# Patient Record
Sex: FEMALE | Race: WHITE | NOT HISPANIC OR LATINO | Employment: FULL TIME | URBAN - METROPOLITAN AREA
[De-identification: names, ages, dates, MRNs, and addresses within clinical notes are randomized per-mention and may not be internally consistent; named-entity substitution may affect disease eponyms.]

---
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
## 2022-01-17 LAB
INFLUENZA A ANTIGEN, POC: NEGATIVE
INFLUENZA B ANTIGEN, POC: NEGATIVE
SARS-COV-2 RNA RESP QL NAA+PROBE: NEGATIVE

## 2022-01-18 ENCOUNTER — HISTORICAL (OUTPATIENT)
Dept: LAB | Facility: HOSPITAL | Age: 22
End: 2022-01-18

## 2022-04-07 ENCOUNTER — HISTORICAL (OUTPATIENT)
Dept: ADMINISTRATIVE | Facility: HOSPITAL | Age: 22
End: 2022-04-07
Payer: COMMERCIAL

## 2022-04-24 VITALS
OXYGEN SATURATION: 99 % | HEIGHT: 62 IN | BODY MASS INDEX: 23.82 KG/M2 | SYSTOLIC BLOOD PRESSURE: 116 MMHG | DIASTOLIC BLOOD PRESSURE: 81 MMHG | WEIGHT: 129.44 LBS

## 2022-09-21 ENCOUNTER — HISTORICAL (OUTPATIENT)
Dept: ADMINISTRATIVE | Facility: HOSPITAL | Age: 22
End: 2022-09-21
Payer: COMMERCIAL

## 2023-05-11 ENCOUNTER — LAB VISIT (OUTPATIENT)
Dept: LAB | Facility: HOSPITAL | Age: 23
End: 2023-05-11
Attending: OBSTETRICS & GYNECOLOGY
Payer: COMMERCIAL

## 2023-05-11 DIAGNOSIS — Z34.80 PRENATAL CARE, SUBSEQUENT PREGNANCY: Primary | ICD-10-CM

## 2023-05-11 LAB
ERYTHROCYTE [DISTWIDTH] IN BLOOD BY AUTOMATED COUNT: 11.9 % (ref 11.5–17)
GROUP & RH: NORMAL
HBV SURFACE AG SERPL QL IA: NONREACTIVE
HCT VFR BLD AUTO: 41.3 % (ref 37–47)
HGB BLD-MCNC: 13.8 G/DL (ref 12–16)
HIV 1+2 AB+HIV1 P24 AG SERPL QL IA: NONREACTIVE
INDIRECT COOMBS GEL: NORMAL
MCH RBC QN AUTO: 27.8 PG (ref 27–31)
MCHC RBC AUTO-ENTMCNC: 33.4 G/DL (ref 33–36)
MCV RBC AUTO: 83.1 FL (ref 80–94)
NRBC BLD AUTO-RTO: 0 %
PLATELET # BLD AUTO: 275 X10(3)/MCL (ref 130–400)
PMV BLD AUTO: 10.4 FL (ref 7.4–10.4)
PROGEST SERPL-MCNC: 17.8 NG/ML
RBC # BLD AUTO: 4.97 X10(6)/MCL (ref 4.2–5.4)
SPECIMEN OUTDATE: NORMAL
T PALLIDUM AB SER QL: NONREACTIVE
WBC # SPEC AUTO: 6.73 X10(3)/MCL (ref 4.5–11.5)

## 2023-05-11 PROCEDURE — 87088 URINE BACTERIA CULTURE: CPT

## 2023-05-11 PROCEDURE — 36415 COLL VENOUS BLD VENIPUNCTURE: CPT

## 2023-05-11 PROCEDURE — 86780 TREPONEMA PALLIDUM: CPT

## 2023-05-11 PROCEDURE — 84144 ASSAY OF PROGESTERONE: CPT

## 2023-05-11 PROCEDURE — 85660 RBC SICKLE CELL TEST: CPT

## 2023-05-11 PROCEDURE — 87340 HEPATITIS B SURFACE AG IA: CPT

## 2023-05-11 PROCEDURE — 85027 COMPLETE CBC AUTOMATED: CPT

## 2023-05-11 PROCEDURE — 86762 RUBELLA ANTIBODY: CPT | Mod: 90

## 2023-05-11 PROCEDURE — 86900 BLOOD TYPING SEROLOGIC ABO: CPT | Performed by: OBSTETRICS & GYNECOLOGY

## 2023-05-11 PROCEDURE — 87389 HIV-1 AG W/HIV-1&-2 AB AG IA: CPT

## 2023-05-12 LAB
HGB S BLD QL SOLY: NEGATIVE
RUBV IGG SERPL IA-ACNC: 0.4
RUBV IGG SERPL QL IA: NEGATIVE

## 2023-05-13 LAB — BACTERIA UR CULT: NO GROWTH

## 2023-12-12 ENCOUNTER — HOSPITAL ENCOUNTER (EMERGENCY)
Facility: HOSPITAL | Age: 23
Discharge: HOME | End: 2023-12-12
Attending: SPECIALIST
Payer: COMMERCIAL

## 2023-12-12 VITALS
DIASTOLIC BLOOD PRESSURE: 76 MMHG | TEMPERATURE: 98 F | OXYGEN SATURATION: 97 % | RESPIRATION RATE: 16 BRPM | SYSTOLIC BLOOD PRESSURE: 125 MMHG | HEART RATE: 97 BPM

## 2023-12-12 PROCEDURE — 99284 EMERGENCY DEPT VISIT MOD MDM: CPT

## 2023-12-12 NOTE — DISCHARGE INSTRUCTIONS
Return to hospital for leaking fluid, regular contractions, vaginal bleeding, and decreased fetal movement. Otherwise, keep scheduled appointments.

## 2023-12-12 NOTE — ED PROVIDER NOTES
ALESSANDRO NOTE     Admit Date: 2023  ALESSANDRO Physician: Elijah Khan  Primary OBGYN: Dr. Tin Kirkpatrick    Admit Diagnosis/Chief Complaint:       Chief Complaint   Patient presents with    Fall     IUP 38.4w c/o falling up the stairs, caught self with knees and elbows       Hospital Course:  Alfredo Hollingsworth is a 23 y.o.  at 38w4d presents complaining of having a minor fall on some stairs this morning.  Patient reports a straight knee but no other injury.    This IUP is complicated by no reported problems.    Patient denies vaginal bleeding, leakage of fluid, contractions, headache, vision changes, RUQ pain, dysuria, fever, and nausea/vomiting.  Fetal Movement: normal.      /76   Pulse 97   Temp 97.9 °F (36.6 °C)   Resp 16   SpO2 97%   Temp:  [97.9 °F (36.6 °C)] 97.9 °F (36.6 °C)  Pulse:  [] 95  Resp:  [16] 16  SpO2:  [97 %] 97 %  BP: (142)/(87) 142/87    General: in no apparent distress  Cardiovascular: regular rate and rhythm no murmurs  Respiratory: clear to auscultation, no wheezes, rales or rhonchi, symmetric air entry  Abdominal: fundus soft, nontender 38 weeks size  Back: lumbar tenderness present CVA tenderness none  Extremeties no redness or tenderness in the calves or thighs no edema minor brush burn scrape on the underside of the left knee.  Knee joint and patella appeared to be uninjured.    SSE:   SVE:  1 cm, 50%, -3, membranes intact      FHT: Category 1  TOCO:  No contractions are noted    Medical Decision Making:  Patient was reassured about fetal well-being.  External monitoring is within normal limits.  Minor injury care was discussed.  Patient will follow up with her primary OB.    LABS:   No results found for this or any previous visit (from the past 24 hour(s)).  [unfilled]     Imaging Results    None          ASSESMENT: Alfredo Hollingsworth is a 23 y.o.   at 38w4d with minor fall and excoriation on her left knee area    Discharge  Diagnosis/Clinical Impression:  Pregnancy 38 weeks.  Minor fall in pregnancy.    Status:Improved/stable    Disposition:  discharged to home    Medications:       Patient Instructions:   There are no discharge medications for this patient.      - Pt was given routine pregnancy instructions including to return to triage if she had any vaginal bleeding (other than spotting for the next 48hrs), any loss of fluid like her water broke, decreased fetal movement, or contractions Q 5min lasting for 2 or more hours. Pt was also instructed to drink copious water. Patient voiced understanding of all these instructions and was subsequently discharged home.    She will follow up with her primary OB.    No discharge procedures on file.    Elijah Khan MD  OB-GYN Hospitalist       Elijah Khan MD  12/12/23 0563

## 2023-12-21 ENCOUNTER — ANESTHESIA (OUTPATIENT)
Dept: OBSTETRICS AND GYNECOLOGY | Facility: HOSPITAL | Age: 23
End: 2023-12-21
Payer: COMMERCIAL

## 2023-12-21 ENCOUNTER — ANESTHESIA EVENT (OUTPATIENT)
Dept: OBSTETRICS AND GYNECOLOGY | Facility: HOSPITAL | Age: 23
End: 2023-12-21
Payer: COMMERCIAL

## 2023-12-21 ENCOUNTER — HOSPITAL ENCOUNTER (INPATIENT)
Facility: HOSPITAL | Age: 23
LOS: 3 days | Discharge: HOME | End: 2023-12-24
Attending: OBSTETRICS & GYNECOLOGY | Admitting: OBSTETRICS & GYNECOLOGY
Payer: COMMERCIAL

## 2023-12-21 DIAGNOSIS — Z34.90 ENCOUNTER FOR INDUCTION OF LABOR: Primary | ICD-10-CM

## 2023-12-21 LAB
BASOPHILS # BLD AUTO: 0.02 X10(3)/MCL
BASOPHILS NFR BLD AUTO: 0.2 %
EOSINOPHIL # BLD AUTO: 0.08 X10(3)/MCL (ref 0–0.9)
EOSINOPHIL NFR BLD AUTO: 1 %
ERYTHROCYTE [DISTWIDTH] IN BLOOD BY AUTOMATED COUNT: 12.8 % (ref 11.5–17)
GROUP & RH: NORMAL
HCT VFR BLD AUTO: 38.3 % (ref 37–47)
HGB BLD-MCNC: 13.3 G/DL (ref 12–16)
IMM GRANULOCYTES # BLD AUTO: 0.02 X10(3)/MCL (ref 0–0.04)
IMM GRANULOCYTES NFR BLD AUTO: 0.2 %
INDIRECT COOMBS GEL: NORMAL
LYMPHOCYTES # BLD AUTO: 2.12 X10(3)/MCL (ref 0.6–4.6)
LYMPHOCYTES NFR BLD AUTO: 25.5 %
MCH RBC QN AUTO: 28.7 PG (ref 27–31)
MCHC RBC AUTO-ENTMCNC: 34.7 G/DL (ref 33–36)
MCV RBC AUTO: 82.5 FL (ref 80–94)
MONOCYTES # BLD AUTO: 0.49 X10(3)/MCL (ref 0.1–1.3)
MONOCYTES NFR BLD AUTO: 5.9 %
NEUTROPHILS # BLD AUTO: 5.6 X10(3)/MCL (ref 2.1–9.2)
NEUTROPHILS NFR BLD AUTO: 67.2 %
NRBC BLD AUTO-RTO: 0 %
PLATELET # BLD AUTO: 228 X10(3)/MCL (ref 130–400)
PMV BLD AUTO: 10.5 FL (ref 7.4–10.4)
RBC # BLD AUTO: 4.64 X10(6)/MCL (ref 4.2–5.4)
SPECIMEN OUTDATE: NORMAL
T PALLIDUM AB SER QL: NONREACTIVE
WBC # SPEC AUTO: 8.33 X10(3)/MCL (ref 4.5–11.5)

## 2023-12-21 PROCEDURE — 25000003 PHARM REV CODE 250: Performed by: ANESTHESIOLOGY

## 2023-12-21 PROCEDURE — 59514 PRA REAN DELIVERY ONLY: ICD-10-PCS | Mod: QY,ANES,, | Performed by: ANESTHESIOLOGY

## 2023-12-21 PROCEDURE — 51702 INSERT TEMP BLADDER CATH: CPT

## 2023-12-21 PROCEDURE — 37000008 HC ANESTHESIA 1ST 15 MINUTES: Performed by: OBSTETRICS & GYNECOLOGY

## 2023-12-21 PROCEDURE — 01968 ANES/ANALG CS DLVR NEURAXIAL: CPT | Mod: QY,ANES,, | Performed by: ANESTHESIOLOGY

## 2023-12-21 PROCEDURE — 59514 PRA REAN DELIVERY ONLY: ICD-10-PCS | Mod: QX,CRNA,,

## 2023-12-21 PROCEDURE — 01968 ANES/ANALG CS DLVR NEURAXIAL: CPT | Mod: QX,CRNA,,

## 2023-12-21 PROCEDURE — 63600175 PHARM REV CODE 636 W HCPCS: Performed by: ANESTHESIOLOGY

## 2023-12-21 PROCEDURE — 37000009 HC ANESTHESIA EA ADD 15 MINS: Performed by: OBSTETRICS & GYNECOLOGY

## 2023-12-21 PROCEDURE — 01968 PR INSERT CATH,ART,PERCUT,SHORTTERM: ICD-10-PCS | Mod: QY,ANES,, | Performed by: ANESTHESIOLOGY

## 2023-12-21 PROCEDURE — 62326 NJX INTERLAMINAR LMBR/SAC: CPT | Performed by: ANESTHESIOLOGY

## 2023-12-21 PROCEDURE — 71000033 HC RECOVERY, INTIAL HOUR: Performed by: OBSTETRICS & GYNECOLOGY

## 2023-12-21 PROCEDURE — 63600175 PHARM REV CODE 636 W HCPCS

## 2023-12-21 PROCEDURE — 11000001 HC ACUTE MED/SURG PRIVATE ROOM

## 2023-12-21 PROCEDURE — 36004724 HC OB OR TIME LEV III - 1ST 15 MIN: Performed by: OBSTETRICS & GYNECOLOGY

## 2023-12-21 PROCEDURE — 01968 PR INSERT CATH,ART,PERCUT,SHORTTERM: ICD-10-PCS | Mod: QX,CRNA,,

## 2023-12-21 PROCEDURE — 59514 CESAREAN DELIVERY ONLY: CPT | Mod: QY,ANES,, | Performed by: ANESTHESIOLOGY

## 2023-12-21 PROCEDURE — 86850 RBC ANTIBODY SCREEN: CPT | Performed by: OBSTETRICS & GYNECOLOGY

## 2023-12-21 PROCEDURE — 36004725 HC OB OR TIME LEV III - EA ADD 15 MIN: Performed by: OBSTETRICS & GYNECOLOGY

## 2023-12-21 PROCEDURE — 85025 COMPLETE CBC W/AUTO DIFF WBC: CPT | Performed by: OBSTETRICS & GYNECOLOGY

## 2023-12-21 PROCEDURE — 25000003 PHARM REV CODE 250: Performed by: OBSTETRICS & GYNECOLOGY

## 2023-12-21 PROCEDURE — 27201423 OPTIME MED/SURG SUP & DEVICES STERILE SUPPLY: Performed by: OBSTETRICS & GYNECOLOGY

## 2023-12-21 PROCEDURE — 59514 CESAREAN DELIVERY ONLY: CPT | Mod: QX,CRNA,,

## 2023-12-21 PROCEDURE — 63600175 PHARM REV CODE 636 W HCPCS: Performed by: OBSTETRICS & GYNECOLOGY

## 2023-12-21 PROCEDURE — 86780 TREPONEMA PALLIDUM: CPT | Performed by: OBSTETRICS & GYNECOLOGY

## 2023-12-21 RX ORDER — MUPIROCIN 20 MG/G
OINTMENT TOPICAL
OUTPATIENT
Start: 2023-12-21

## 2023-12-21 RX ORDER — MISOPROSTOL 100 UG/1
800 TABLET ORAL ONCE AS NEEDED
Status: DISCONTINUED | OUTPATIENT
Start: 2023-12-21 | End: 2023-12-22

## 2023-12-21 RX ORDER — METHYLERGONOVINE MALEATE 0.2 MG/ML
200 INJECTION INTRAVENOUS ONCE AS NEEDED
Status: COMPLETED | OUTPATIENT
Start: 2023-12-21 | End: 2023-12-22

## 2023-12-21 RX ORDER — SODIUM CITRATE AND CITRIC ACID MONOHYDRATE 334; 500 MG/5ML; MG/5ML
30 SOLUTION ORAL ONCE
Status: CANCELLED | OUTPATIENT
Start: 2023-12-21 | End: 2023-12-21

## 2023-12-21 RX ORDER — OXYTOCIN/RINGER'S LACTATE 30/500 ML
95 PLASTIC BAG, INJECTION (ML) INTRAVENOUS ONCE AS NEEDED
Status: DISCONTINUED | OUTPATIENT
Start: 2023-12-21 | End: 2023-12-22

## 2023-12-21 RX ORDER — BUPIVACAINE HYDROCHLORIDE 2.5 MG/ML
INJECTION, SOLUTION EPIDURAL; INFILTRATION; INTRACAUDAL
Status: COMPLETED | OUTPATIENT
Start: 2023-12-21 | End: 2023-12-21

## 2023-12-21 RX ORDER — MISOPROSTOL 100 MCG
25 TABLET ORAL ONCE
Status: COMPLETED | OUTPATIENT
Start: 2023-12-21 | End: 2023-12-21

## 2023-12-21 RX ORDER — ONDANSETRON 4 MG/1
8 TABLET, ORALLY DISINTEGRATING ORAL EVERY 8 HOURS PRN
Status: DISCONTINUED | OUTPATIENT
Start: 2023-12-21 | End: 2023-12-22

## 2023-12-21 RX ORDER — SIMETHICONE 80 MG
1 TABLET,CHEWABLE ORAL 4 TIMES DAILY PRN
Status: DISCONTINUED | OUTPATIENT
Start: 2023-12-21 | End: 2023-12-22

## 2023-12-21 RX ORDER — OXYTOCIN/RINGER'S LACTATE 30/500 ML
334 PLASTIC BAG, INJECTION (ML) INTRAVENOUS ONCE
Status: DISCONTINUED | OUTPATIENT
Start: 2023-12-21 | End: 2023-12-21

## 2023-12-21 RX ORDER — OXYTOCIN/RINGER'S LACTATE 30/500 ML
95 PLASTIC BAG, INJECTION (ML) INTRAVENOUS ONCE
Status: DISCONTINUED | OUTPATIENT
Start: 2023-12-21 | End: 2023-12-21

## 2023-12-21 RX ORDER — FENTANYL/BUPIVACAINE/NS/PF 2-1250MCG
PLASTIC BAG, INJECTION (ML) INJECTION CONTINUOUS
Status: DISCONTINUED | OUTPATIENT
Start: 2023-12-21 | End: 2023-12-24 | Stop reason: HOSPADM

## 2023-12-21 RX ORDER — SODIUM CHLORIDE, SODIUM LACTATE, POTASSIUM CHLORIDE, CALCIUM CHLORIDE 600; 310; 30; 20 MG/100ML; MG/100ML; MG/100ML; MG/100ML
INJECTION, SOLUTION INTRAVENOUS CONTINUOUS
Status: DISCONTINUED | OUTPATIENT
Start: 2023-12-21 | End: 2023-12-24 | Stop reason: HOSPADM

## 2023-12-21 RX ORDER — DIPHENOXYLATE HYDROCHLORIDE AND ATROPINE SULFATE 2.5; .025 MG/1; MG/1
2 TABLET ORAL EVERY 6 HOURS PRN
Status: DISCONTINUED | OUTPATIENT
Start: 2023-12-21 | End: 2023-12-22

## 2023-12-21 RX ORDER — CALCIUM CARBONATE 200(500)MG
500 TABLET,CHEWABLE ORAL 3 TIMES DAILY PRN
Status: DISCONTINUED | OUTPATIENT
Start: 2023-12-21 | End: 2023-12-24 | Stop reason: HOSPADM

## 2023-12-21 RX ORDER — MISOPROSTOL 100 MCG
50 TABLET ORAL ONCE
Status: DISCONTINUED | OUTPATIENT
Start: 2023-12-21 | End: 2023-12-21

## 2023-12-21 RX ORDER — OXYTOCIN/RINGER'S LACTATE 30/500 ML
334 PLASTIC BAG, INJECTION (ML) INTRAVENOUS ONCE AS NEEDED
Status: DISCONTINUED | OUTPATIENT
Start: 2023-12-21 | End: 2023-12-22

## 2023-12-21 RX ORDER — NALBUPHINE HYDROCHLORIDE 10 MG/ML
2.5 INJECTION, SOLUTION INTRAMUSCULAR; INTRAVENOUS; SUBCUTANEOUS ONCE
Status: CANCELLED | OUTPATIENT
Start: 2023-12-21 | End: 2023-12-21

## 2023-12-21 RX ORDER — PROCHLORPERAZINE EDISYLATE 5 MG/ML
5 INJECTION INTRAMUSCULAR; INTRAVENOUS EVERY 6 HOURS PRN
Status: DISCONTINUED | OUTPATIENT
Start: 2023-12-21 | End: 2023-12-22

## 2023-12-21 RX ORDER — OXYTOCIN/RINGER'S LACTATE 30/500 ML
0-30 PLASTIC BAG, INJECTION (ML) INTRAVENOUS CONTINUOUS
Status: DISCONTINUED | OUTPATIENT
Start: 2023-12-21 | End: 2023-12-24 | Stop reason: HOSPADM

## 2023-12-21 RX ORDER — LIDOCAINE HYDROCHLORIDE 10 MG/ML
10 INJECTION INFILTRATION; PERINEURAL ONCE AS NEEDED
Status: DISCONTINUED | OUTPATIENT
Start: 2023-12-21 | End: 2023-12-24 | Stop reason: HOSPADM

## 2023-12-21 RX ORDER — EPHEDRINE SULFATE 50 MG/ML
10 INJECTION, SOLUTION INTRAVENOUS ONCE AS NEEDED
Status: CANCELLED | OUTPATIENT
Start: 2023-12-21 | End: 2035-05-19

## 2023-12-21 RX ORDER — OXYTOCIN 10 [USP'U]/ML
10 INJECTION, SOLUTION INTRAMUSCULAR; INTRAVENOUS ONCE AS NEEDED
Status: DISCONTINUED | OUTPATIENT
Start: 2023-12-21 | End: 2023-12-22

## 2023-12-21 RX ORDER — MISOPROSTOL 100 MCG
50 TABLET ORAL ONCE
Status: COMPLETED | OUTPATIENT
Start: 2023-12-21 | End: 2023-12-21

## 2023-12-21 RX ORDER — FAMOTIDINE 10 MG/ML
20 INJECTION INTRAVENOUS ONCE
Status: CANCELLED | OUTPATIENT
Start: 2023-12-21 | End: 2023-12-21

## 2023-12-21 RX ORDER — OXYTOCIN/RINGER'S LACTATE 30/500 ML
4 PLASTIC BAG, INJECTION (ML) INTRAVENOUS CONTINUOUS
Status: DISCONTINUED | OUTPATIENT
Start: 2023-12-21 | End: 2023-12-21

## 2023-12-21 RX ORDER — CARBOPROST TROMETHAMINE 250 UG/ML
250 INJECTION, SOLUTION INTRAMUSCULAR
Status: DISCONTINUED | OUTPATIENT
Start: 2023-12-21 | End: 2023-12-22

## 2023-12-21 RX ADMIN — Medication 12 ML/HR: at 08:12

## 2023-12-21 RX ADMIN — MISOPROSTOL 25 MCG: 100 TABLET ORAL at 02:12

## 2023-12-21 RX ADMIN — SODIUM CHLORIDE, POTASSIUM CHLORIDE, SODIUM LACTATE AND CALCIUM CHLORIDE: 600; 310; 30; 20 INJECTION, SOLUTION INTRAVENOUS at 11:12

## 2023-12-21 RX ADMIN — FENTANYL CITRATE 100 MCG: 50 INJECTION, SOLUTION INTRAMUSCULAR; INTRAVENOUS at 11:12

## 2023-12-21 RX ADMIN — Medication 4 MILLI-UNITS/MIN: at 10:12

## 2023-12-21 RX ADMIN — LIDOCAINE HYDROCHLORIDE 15 ML: 20 INJECTION, SOLUTION EPIDURAL; INFILTRATION; INTRACAUDAL; PERINEURAL at 11:12

## 2023-12-21 RX ADMIN — MISOPROSTOL 25 MCG: 100 TABLET ORAL at 09:12

## 2023-12-21 RX ADMIN — ONDANSETRON 8 MG: 2 INJECTION INTRAMUSCULAR; INTRAVENOUS at 11:12

## 2023-12-21 RX ADMIN — SODIUM CHLORIDE, POTASSIUM CHLORIDE, SODIUM LACTATE AND CALCIUM CHLORIDE: 600; 310; 30; 20 INJECTION, SOLUTION INTRAVENOUS at 08:12

## 2023-12-21 RX ADMIN — BUPIVACAINE HYDROCHLORIDE 10 ML: 2.5 INJECTION, SOLUTION EPIDURAL; INFILTRATION; INTRACAUDAL; PERINEURAL at 08:12

## 2023-12-21 NOTE — PROGRESS NOTES
"Patient states md was at bedside and checked her cervix.  States she was "almost 3."  Patient's mother states Dr. Kirkpatrick said he will see if she can have a sandwich.  Will call MD for further orders.  "

## 2023-12-22 PROCEDURE — 63600175 PHARM REV CODE 636 W HCPCS

## 2023-12-22 PROCEDURE — 72100002 HC LABOR CARE, 1ST 8 HOURS

## 2023-12-22 PROCEDURE — 25000003 PHARM REV CODE 250

## 2023-12-22 PROCEDURE — 11000001 HC ACUTE MED/SURG PRIVATE ROOM

## 2023-12-22 PROCEDURE — 63600175 PHARM REV CODE 636 W HCPCS: Performed by: OBSTETRICS & GYNECOLOGY

## 2023-12-22 PROCEDURE — 72100003 HC LABOR CARE, EA. ADDL. 8 HRS

## 2023-12-22 PROCEDURE — 25000003 PHARM REV CODE 250: Performed by: OBSTETRICS & GYNECOLOGY

## 2023-12-22 PROCEDURE — 27000492 HC SLEEVE, SCD T/L

## 2023-12-22 RX ORDER — OXYTOCIN 10 [USP'U]/ML
10 INJECTION, SOLUTION INTRAMUSCULAR; INTRAVENOUS ONCE AS NEEDED
Status: DISCONTINUED | OUTPATIENT
Start: 2023-12-22 | End: 2023-12-24 | Stop reason: HOSPADM

## 2023-12-22 RX ORDER — PROCHLORPERAZINE EDISYLATE 5 MG/ML
5 INJECTION INTRAMUSCULAR; INTRAVENOUS EVERY 6 HOURS PRN
Status: DISCONTINUED | OUTPATIENT
Start: 2023-12-22 | End: 2023-12-24 | Stop reason: HOSPADM

## 2023-12-22 RX ORDER — DIPHENHYDRAMINE HCL 25 MG
25 CAPSULE ORAL EVERY 4 HOURS PRN
Status: DISCONTINUED | OUTPATIENT
Start: 2023-12-22 | End: 2023-12-24 | Stop reason: HOSPADM

## 2023-12-22 RX ORDER — PRENATAL WITH FERROUS FUM AND FOLIC ACID 3080; 920; 120; 400; 22; 1.84; 3; 20; 10; 1; 12; 200; 27; 25; 2 [IU]/1; [IU]/1; MG/1; [IU]/1; MG/1; MG/1; MG/1; MG/1; MG/1; MG/1; UG/1; MG/1; MG/1; MG/1; MG/1
1 TABLET ORAL DAILY
Status: DISCONTINUED | OUTPATIENT
Start: 2023-12-22 | End: 2023-12-24 | Stop reason: HOSPADM

## 2023-12-22 RX ORDER — CEFAZOLIN SODIUM 1 G/3ML
INJECTION, POWDER, FOR SOLUTION INTRAMUSCULAR; INTRAVENOUS
Status: DISCONTINUED | OUTPATIENT
Start: 2023-12-22 | End: 2023-12-22

## 2023-12-22 RX ORDER — MORPHINE SULFATE 1 MG/ML
INJECTION, SOLUTION EPIDURAL; INTRATHECAL; INTRAVENOUS
Status: DISCONTINUED | OUTPATIENT
Start: 2023-12-22 | End: 2023-12-22

## 2023-12-22 RX ORDER — DIPHENOXYLATE HYDROCHLORIDE AND ATROPINE SULFATE 2.5; .025 MG/1; MG/1
2 TABLET ORAL EVERY 6 HOURS PRN
Status: DISCONTINUED | OUTPATIENT
Start: 2023-12-22 | End: 2023-12-24 | Stop reason: HOSPADM

## 2023-12-22 RX ORDER — ONDANSETRON 4 MG/1
8 TABLET, ORALLY DISINTEGRATING ORAL EVERY 8 HOURS PRN
Status: DISCONTINUED | OUTPATIENT
Start: 2023-12-22 | End: 2023-12-24 | Stop reason: HOSPADM

## 2023-12-22 RX ORDER — ACETAMINOPHEN 10 MG/ML
INJECTION, SOLUTION INTRAVENOUS
Status: DISCONTINUED | OUTPATIENT
Start: 2023-12-22 | End: 2023-12-22

## 2023-12-22 RX ORDER — FENTANYL CITRATE 50 UG/ML
INJECTION, SOLUTION INTRAMUSCULAR; INTRAVENOUS
Status: DISCONTINUED | OUTPATIENT
Start: 2023-12-21 | End: 2023-12-22

## 2023-12-22 RX ORDER — HYDROMORPHONE HYDROCHLORIDE 2 MG/ML
1 INJECTION, SOLUTION INTRAMUSCULAR; INTRAVENOUS; SUBCUTANEOUS EVERY 4 HOURS PRN
Status: DISCONTINUED | OUTPATIENT
Start: 2023-12-22 | End: 2023-12-24 | Stop reason: HOSPADM

## 2023-12-22 RX ORDER — OXYCODONE AND ACETAMINOPHEN 10; 325 MG/1; MG/1
1 TABLET ORAL EVERY 4 HOURS PRN
Status: DISCONTINUED | OUTPATIENT
Start: 2023-12-22 | End: 2023-12-24 | Stop reason: HOSPADM

## 2023-12-22 RX ORDER — DOCUSATE SODIUM 100 MG/1
200 CAPSULE, LIQUID FILLED ORAL 2 TIMES DAILY
Status: DISCONTINUED | OUTPATIENT
Start: 2023-12-22 | End: 2023-12-24 | Stop reason: HOSPADM

## 2023-12-22 RX ORDER — ONDANSETRON 2 MG/ML
INJECTION INTRAMUSCULAR; INTRAVENOUS
Status: DISCONTINUED | OUTPATIENT
Start: 2023-12-21 | End: 2023-12-22

## 2023-12-22 RX ORDER — MISOPROSTOL 100 UG/1
800 TABLET ORAL ONCE AS NEEDED
Status: DISCONTINUED | OUTPATIENT
Start: 2023-12-22 | End: 2023-12-24 | Stop reason: HOSPADM

## 2023-12-22 RX ORDER — OXYTOCIN/RINGER'S LACTATE 30/500 ML
334 PLASTIC BAG, INJECTION (ML) INTRAVENOUS ONCE AS NEEDED
Status: DISCONTINUED | OUTPATIENT
Start: 2023-12-22 | End: 2023-12-24 | Stop reason: HOSPADM

## 2023-12-22 RX ORDER — LIDOCAINE HYDROCHLORIDE 20 MG/ML
INJECTION, SOLUTION EPIDURAL; INFILTRATION; INTRACAUDAL; PERINEURAL
Status: DISCONTINUED | OUTPATIENT
Start: 2023-12-21 | End: 2023-12-22

## 2023-12-22 RX ORDER — SODIUM CHLORIDE 0.9 % (FLUSH) 0.9 %
10 SYRINGE (ML) INJECTION
Status: DISCONTINUED | OUTPATIENT
Start: 2023-12-22 | End: 2023-12-24 | Stop reason: HOSPADM

## 2023-12-22 RX ORDER — METHYLERGONOVINE MALEATE 0.2 MG/ML
200 INJECTION INTRAVENOUS ONCE AS NEEDED
Status: DISCONTINUED | OUTPATIENT
Start: 2023-12-22 | End: 2023-12-24 | Stop reason: HOSPADM

## 2023-12-22 RX ORDER — BISACODYL 10 MG
10 SUPPOSITORY, RECTAL RECTAL ONCE AS NEEDED
Status: DISCONTINUED | OUTPATIENT
Start: 2023-12-22 | End: 2023-12-24 | Stop reason: HOSPADM

## 2023-12-22 RX ORDER — OXYTOCIN/RINGER'S LACTATE 30/500 ML
95 PLASTIC BAG, INJECTION (ML) INTRAVENOUS ONCE AS NEEDED
Status: DISCONTINUED | OUTPATIENT
Start: 2023-12-22 | End: 2023-12-24 | Stop reason: HOSPADM

## 2023-12-22 RX ORDER — OXYTOCIN/RINGER'S LACTATE 30/500 ML
95 PLASTIC BAG, INJECTION (ML) INTRAVENOUS ONCE
Status: DISCONTINUED | OUTPATIENT
Start: 2023-12-22 | End: 2023-12-24 | Stop reason: HOSPADM

## 2023-12-22 RX ORDER — TRANEXAMIC ACID 100 MG/ML
INJECTION, SOLUTION INTRAVENOUS
Status: DISCONTINUED | OUTPATIENT
Start: 2023-12-22 | End: 2023-12-22

## 2023-12-22 RX ORDER — OXYTOCIN/RINGER'S LACTATE 30/500 ML
PLASTIC BAG, INJECTION (ML) INTRAVENOUS
Status: DISCONTINUED | OUTPATIENT
Start: 2023-12-22 | End: 2023-12-22

## 2023-12-22 RX ORDER — AMOXICILLIN 250 MG
1 CAPSULE ORAL NIGHTLY PRN
Status: DISCONTINUED | OUTPATIENT
Start: 2023-12-22 | End: 2023-12-24 | Stop reason: HOSPADM

## 2023-12-22 RX ORDER — ADHESIVE BANDAGE
30 BANDAGE TOPICAL 2 TIMES DAILY PRN
Status: DISCONTINUED | OUTPATIENT
Start: 2023-12-23 | End: 2023-12-24 | Stop reason: HOSPADM

## 2023-12-22 RX ORDER — SIMETHICONE 80 MG
1 TABLET,CHEWABLE ORAL EVERY 6 HOURS PRN
Status: DISCONTINUED | OUTPATIENT
Start: 2023-12-22 | End: 2023-12-24 | Stop reason: HOSPADM

## 2023-12-22 RX ORDER — SODIUM CHLORIDE, SODIUM LACTATE, POTASSIUM CHLORIDE, CALCIUM CHLORIDE 600; 310; 30; 20 MG/100ML; MG/100ML; MG/100ML; MG/100ML
INJECTION, SOLUTION INTRAVENOUS CONTINUOUS PRN
Status: DISCONTINUED | OUTPATIENT
Start: 2023-12-21 | End: 2023-12-22

## 2023-12-22 RX ORDER — CARBOPROST TROMETHAMINE 250 UG/ML
250 INJECTION, SOLUTION INTRAMUSCULAR
Status: DISCONTINUED | OUTPATIENT
Start: 2023-12-22 | End: 2023-12-24 | Stop reason: HOSPADM

## 2023-12-22 RX ORDER — IBUPROFEN 600 MG/1
600 TABLET ORAL 4 TIMES DAILY
Status: DISCONTINUED | OUTPATIENT
Start: 2023-12-22 | End: 2023-12-24 | Stop reason: HOSPADM

## 2023-12-22 RX ADMIN — MORPHINE SULFATE 2 MG: 1 INJECTION, SOLUTION EPIDURAL; INTRATHECAL; INTRAVENOUS at 12:12

## 2023-12-22 RX ADMIN — LIDOCAINE HYDROCHLORIDE 5 ML: 20 INJECTION, SOLUTION EPIDURAL; INFILTRATION; INTRACAUDAL; PERINEURAL at 12:12

## 2023-12-22 RX ADMIN — IBUPROFEN 600 MG: 600 TABLET, FILM COATED ORAL at 02:12

## 2023-12-22 RX ADMIN — ACETAMINOPHEN 1000 MG: 10 INJECTION, SOLUTION INTRAVENOUS at 12:12

## 2023-12-22 RX ADMIN — MORPHINE SULFATE 3 MG: 1 INJECTION, SOLUTION EPIDURAL; INTRATHECAL; INTRAVENOUS at 12:12

## 2023-12-22 RX ADMIN — PRENATAL VITAMINS-IRON FUMARATE 27 MG IRON-FOLIC ACID 0.8 MG TABLET 1 TABLET: at 08:12

## 2023-12-22 RX ADMIN — OXYCODONE AND ACETAMINOPHEN 1 TABLET: 10; 325 TABLET ORAL at 09:12

## 2023-12-22 RX ADMIN — Medication 500 ML: at 12:12

## 2023-12-22 RX ADMIN — CEFAZOLIN 2 G: 330 INJECTION, POWDER, FOR SOLUTION INTRAMUSCULAR; INTRAVENOUS at 12:12

## 2023-12-22 RX ADMIN — METHYLERGONOVINE MALEATE 200 MCG: 0.2 INJECTION, SOLUTION INTRAMUSCULAR; INTRAVENOUS at 12:12

## 2023-12-22 RX ADMIN — IBUPROFEN 600 MG: 600 TABLET, FILM COATED ORAL at 08:12

## 2023-12-22 RX ADMIN — DOCUSATE SODIUM 200 MG: 100 CAPSULE, LIQUID FILLED ORAL at 08:12

## 2023-12-22 RX ADMIN — TRANEXAMIC ACID 1000 MG: 100 INJECTION, SOLUTION INTRAVENOUS at 12:12

## 2023-12-22 NOTE — ANESTHESIA PREPROCEDURE EVALUATION
12/21/2023  Alfredo Hollingsworth is a 23 y.o., female.      Pre-op Assessment    I have reviewed the Patient Summary Reports.     I have reviewed the Nursing Notes. I have reviewed the NPO Status.   I have reviewed the Medications.     Review of Systems  Anesthesia Hx:  No problems with previous Anesthesia                Hematology/Oncology:  Hematology Normal   Oncology Normal                                   EENT/Dental:  EENT/Dental Normal           Cardiovascular:  Cardiovascular Normal                                            Pulmonary:  Pulmonary Normal                       Renal/:  Renal/ Normal                 Hepatic/GI:  Hepatic/GI Normal                 Musculoskeletal:  Musculoskeletal Normal                Neurological:  Neurology Normal                                      Endocrine:  Endocrine Normal            Dermatological:  Skin Normal    Psych:  Psychiatric Normal                  Physical Exam  General: Well nourished, Cooperative, Alert and Oriented    Airway:  Mallampati: II   Mouth Opening: Normal  TM Distance: Normal  Tongue: Normal  Neck ROM: Normal ROM    Dental:  Intact    Chest/Lungs:  Clear to auscultation    Heart:  Rate: Normal    Anesthesia Plan  Type of Anesthesia, risks & benefits discussed:    Anesthesia Type: Epidural  Intra-op Monitoring Plan: Standard ASA Monitors  Post Op Pain Control Plan: multimodal analgesia  Informed Consent: Informed consent signed with the Patient and all parties understand the risks and agree with anesthesia plan.  All questions answered.   ASA Score: 1  Day of Surgery Review of History & Physical: H&P Update referred to the surgeon/provider.I have interviewed and examined the patient. I have reviewed the patient's H&P dated:     Ready For Surgery From Anesthesia Perspective.   .

## 2023-12-22 NOTE — ANESTHESIA PROCEDURE NOTES
Epidural    Patient location during procedure: OB   Reason for block: primary anesthetic   Reason for block: labor analgesia requested by patient and obstetrician  Diagnosis: labor   Start time: 12/21/2023 8:40 PM  Timeout: 12/21/2023 8:40 PM  End time: 12/21/2023 8:40 PM    Staffing  Performing Provider: Osbaldo Dixon MD  Authorizing Provider: Osbaldo Dixon MD    Staffing  Performed by: Osbaldo Dixon MD  Authorized by: Osbaldo Dixon MD        Preanesthetic Checklist  Completed: patient identified, IV checked, site marked, risks and benefits discussed, surgical consent, monitors and equipment checked, pre-op evaluation, timeout performed, anesthesia consent given, hand hygiene performed and patient being monitored  Preparation  Patient position: sitting  Prep: ChloraPrep  Patient monitoring: ECG and Blood Pressure  Reason for block: primary anesthetic   Epidural  Skin Anesthetic: lidocaine 1%  Skin Wheal: 2 mL  Administration type: continuous  Approach: midline  Interspace: L3-4    Injection technique: SUKI saline  Needle and Epidural Catheter  Needle type: Tuohy   Needle gauge: 17  Needle length: 3.5 inches  Catheter type: springwIncanthera  Catheter size: 19 G  Insertion Attempts: 2  Test dose: 5 mL of lidocaine 1.5% with Epi 1-to-200,000  Additional Documentation: incremental injection, negative aspiration for heme and CSF, no paresthesia on injection, no signs/symptoms of IV or SA injection, no significant pain on injection and no significant complaints from patient  Needle localization: anatomical landmarks  Assessment  Upper dermatomal levels - Left: T12  Right: T12   Dermatomal levels determined by alcohol wipe  Ease of block: moderate  Patient's tolerance of the procedure: comfortable throughout block and no complaints No inadvertent dural puncture with Tuohy.  Dural puncture not performed with spinal needle    Medications:    Medications: bupivacaine (pf) (MARCAINE) injection 0.25% -  Epidural   10 mL - 12/21/2023 8:45:00 PM

## 2023-12-22 NOTE — TRANSFER OF CARE
"Anesthesia Transfer of Care Note    Patient: Alfredo Hollingsworth    Procedure(s) Performed: * No procedures listed *    Patient location: Labor and Delivery    Anesthesia Type: spinal    Transport from OR: Transported from OR on room air with adequate spontaneous ventilation    Post pain: adequate analgesia    Post assessment: no apparent anesthetic complications    Post vital signs: stable    Level of consciousness: awake, alert and oriented    Nausea/Vomiting: no nausea/vomiting    Complications: none    Transfer of care protocol was followed      Last vitals: Visit Vitals  BP (!) 108/59   Pulse 85   Temp 36.8 °C (98.2 °F)   Resp 18   Ht 5' 2" (1.575 m)   Wt 83 kg (183 lb)   SpO2 96%   Breastfeeding No   BMI 33.47 kg/m²     "

## 2023-12-22 NOTE — H&P
[facility] - Labor and Delivery  Obstetrics  History & Physical    Patient Name: Alfredo Hollingsworth  MRN: [id number]  Admission Date: 2023  Primary Care Provider: Kwasi Durant MD    Subjective:     Principal Problem:<principal problem not specified>    History of Present Illness: PAT for induction    Obstetric HPI:  Patient reports None contractions, active fetal movement, No vaginal bleeding , No loss of fluid     This pregnancy has been complicated by no issues    OB History    Para Term  AB Living   2 0 0 0 1 0   SAB IAB Ectopic Multiple Live Births   1 0 0 0 0      # Outcome Date GA Lbr Ihsan/2nd Weight Sex Delivery Anes PTL Lv   2 Current            1 2019 8w0d       FD     History reviewed. No pertinent past medical history.  Past Surgical History:   Procedure Laterality Date    TONSILLECTOMY AND ADENOIDECTOMY         No medications prior to admission.       Review of patient's allergies indicates:  No Known Allergies     Family History    None       Tobacco Use    Smoking status: Never    Smokeless tobacco: Never   Substance and Sexual Activity    Alcohol use: Never    Drug use: Never    Sexual activity: Not on file     Review of Systems   Objective:     Vital Signs (Most Recent):  Temp: 98.2 °F (36.8 °C) (23 1450)  Pulse: 85 (23 2248)  Resp: 18 (23 1340)  BP: (!) 108/59 (23 2248)  SpO2: 96 % (23 2232) Vital Signs (24h Range):  Temp:  [98.1 °F (36.7 °C)-98.2 °F (36.8 °C)] 98.2 °F (36.8 °C)  Pulse:  [] 85  Resp:  [18] 18  SpO2:  [94 %-99 %] 96 %  BP: (103-189)/(53-90) 108/59     Weight: 83 kg (183 lb)  Body mass index is 33.47 kg/m².    FHT: 147Cat 1 (reassuring)  TOCO:  Q 0 minutes    Physical Exam    Cervix:  Dilation:  2  Effacement:  75%  Station: -3  Presentation: Vertex     Significant Labs:  Lab Results   Component Value Date    GROUPTRH B POS 2023       I have personallly reviewed all pertinent lab results from the  last 24 hours.    Assessment/Plan:     23 y.o. female  at 39w6d for:    There are no hospital problems to display for this patient.      PAULINA Kirkpatrick MD  Obstetrics  [facility] - Labor and Delivery

## 2023-12-22 NOTE — OP NOTE
[facility]                       [address]                      [place] [address]    PATIENT NAME:      KEANU YOST  YOB: 2000  CSN:               [id number]  MRN:               [id number]  ADMIT DATE:        2023 08:19:00  PHYSICIAN:         Tin Kirkpatrick MD                          OPERATIVE REPORT      DATE OF SURGERY:    2023 00:00:00    SURGEON:  Tin Kirkpatrick MD    ASSISTANT:  Elijah Khan MD    OPERATION PERFORMED:  Primary low transverse  section.    PREOPERATIVE DIAGNOSIS:  The patient is a 23-year-old white female at term   gestation with a nonreassuring fetal heart rate tracing.    POSTOPERATIVE DIAGNOSIS:  The patient is a 23-year-old white female at term   gestation with a nonreassuring fetal heart rate tracing.    PROCEDURE IN DETAIL:  After proper consents were obtained, the patient was   brought to the operating room, placed in supine position, and  underwent   adequate prepping and draping.    Pfannenstiel skin incision was made in the   usual fashion.  The abdomen was opened up in layers.  The peritoneum was entered   atraumatically.  The bladder flap was created with the Metzenbaum scissors.    Low transverse uterine incision was made without difficulty and the fluid was   noted to be clear.  There was a tight nuchal cord noted.  The infant delivered   in a vertex presentation.  The cord was double clamped, cut, and the infant was   handed to the nurses.  Placenta was removed manually.  The uterus was   exteriorized onto the abdominal surface.  Inside of the uterus was cleaned with   moist laps.  The lower uterine segment was then closed with #1 chromic running   locking stitch and hemostasis was noted to be achieved.  The uterus was placed   back into the abdominal cavity and both gutters were cleaned with moist laps.    The lower uterine segment was then inspected one  more time and noted to be   absolutely dry.  The needle and lap counts correct x2.  Peritoneum was closed   with 2-0 chromic simple stitch.  The fascia was closed with #1 Vicryl simple   running stitch.  The subcutaneous tissue closed with 2-0 plain in interrupted   stitch fashion.  The skin was closed with skin clips.  The patient tolerated the   procedure well, was brought to the recovery room in stable fashion.        ______________________________  Tin Kirkpatrick MD CEP/VALENTINA  DD:  12/22/2023  Time:  06:33AM  DT:  12/22/2023  Time:  07:19AM  Job #:  [id number]      OPERATIVE REPORT

## 2023-12-23 PROCEDURE — 25000003 PHARM REV CODE 250: Performed by: OBSTETRICS & GYNECOLOGY

## 2023-12-23 PROCEDURE — 90471 IMMUNIZATION ADMIN: CPT | Performed by: OBSTETRICS & GYNECOLOGY

## 2023-12-23 PROCEDURE — 90710 MMRV VACCINE SC: CPT | Mod: JG | Performed by: OBSTETRICS & GYNECOLOGY

## 2023-12-23 PROCEDURE — 11000001 HC ACUTE MED/SURG PRIVATE ROOM

## 2023-12-23 PROCEDURE — 63600175 PHARM REV CODE 636 W HCPCS: Mod: JG | Performed by: OBSTETRICS & GYNECOLOGY

## 2023-12-23 RX ADMIN — IBUPROFEN 600 MG: 600 TABLET, FILM COATED ORAL at 08:12

## 2023-12-23 RX ADMIN — DOCUSATE SODIUM 200 MG: 100 CAPSULE, LIQUID FILLED ORAL at 08:12

## 2023-12-23 RX ADMIN — MEASLES, MUMPS, AND RUBELLA VIRUS VACCINE LIVE 0.5 ML: 1000; 12500; 1000 INJECTION, POWDER, LYOPHILIZED, FOR SUSPENSION SUBCUTANEOUS at 03:12

## 2023-12-23 RX ADMIN — IBUPROFEN 600 MG: 600 TABLET, FILM COATED ORAL at 02:12

## 2023-12-23 RX ADMIN — OXYCODONE AND ACETAMINOPHEN 1 TABLET: 10; 325 TABLET ORAL at 02:12

## 2023-12-23 RX ADMIN — PRENATAL VITAMINS-IRON FUMARATE 27 MG IRON-FOLIC ACID 0.8 MG TABLET 1 TABLET: at 08:12

## 2023-12-23 RX ADMIN — IBUPROFEN 600 MG: 600 TABLET, FILM COATED ORAL at 01:12

## 2023-12-23 RX ADMIN — OXYCODONE AND ACETAMINOPHEN 1 TABLET: 10; 325 TABLET ORAL at 10:12

## 2023-12-23 NOTE — ANESTHESIA POSTPROCEDURE EVALUATION
Anesthesia Post Evaluation    Patient: Alfredo Hollingsworth    Procedure(s) Performed: * No procedures listed *    Final Anesthesia Type: epidural      Patient location during evaluation: labor & delivery  Patient participation: Yes- Able to Participate  Level of consciousness: awake and alert  Post-procedure vital signs: reviewed and stable  Pain management: adequate  Airway patency: patent  BINU mitigation strategies: Multimodal analgesia  PONV status at discharge: No PONV  Anesthetic complications: no      Cardiovascular status: blood pressure returned to baseline and hemodynamically stable  Respiratory status: unassisted  Hydration status: euvolemic  Follow-up not needed.          Vitals Value Taken Time   /78 12/22/23 1942   Temp 36.5 °C (97.7 °F) 12/22/23 1942   Pulse 86 12/22/23 1942   Resp 16 12/22/23 0500   SpO2 95 % 12/22/23 0215         No case tracking events are documented in the log.      Pain/Emily Score: Pain Rating Prior to Med Admin: 7 (12/22/2023  2:05 PM)  Pain Rating Post Med Admin: 0 (12/22/2023  3:05 PM)  Emily Score: 9 (12/22/2023  2:15 AM)

## 2023-12-24 PROBLEM — Z34.90 PREGNANCY: Status: ACTIVE | Noted: 2023-12-24

## 2023-12-24 PROCEDURE — 25000003 PHARM REV CODE 250: Performed by: OBSTETRICS & GYNECOLOGY

## 2023-12-24 RX ADMIN — IBUPROFEN 600 MG: 600 TABLET, FILM COATED ORAL at 08:12

## 2023-12-24 RX ADMIN — DOCUSATE SODIUM 200 MG: 100 CAPSULE, LIQUID FILLED ORAL at 08:12

## 2023-12-24 RX ADMIN — IBUPROFEN 600 MG: 600 TABLET, FILM COATED ORAL at 02:12

## 2023-12-24 NOTE — DISCHARGE SUMMARY
"[facility] - 2nd Floor Mother/Baby Unit  Obstetrics  Discharge Summary      Patient Name: Alfredo Hollingsworth  MRN: [id number]  Admission Date: 2023  Hospital Length of Stay: 3 days  Discharge Date and Time:  2023 9:52 AM  Attending Physician: Tin Parsons MD   Discharging Provider: Tin Parsons MD   Primary Care Provider: Kwasi Durant MD    HPI: No notes on file    FHT: 147Cat 1 (reassuring)  TOCO:  Q 0 minutes    Procedure(s) (LRB):   SECTION (N/A)     Hospital Course:   No notes on file     Consults (From admission, onward)          Status Ordering Provider     Inpatient consult to Anesthesiology  Once        Provider:  Mark Kumari MD    Acknowledged TIN PARSONS            Final Active Diagnoses:    Diagnosis Date Noted POA    PRINCIPAL PROBLEM:  Pregnancy [Z34.90] 2023 Not Applicable      Problems Resolved During this Admission:        Significant Diagnostic Studies: N/A      Feeding Method: breast    Immunizations       Date Immunization Status Dose Route/Site Given by    23 0304 MMR Deleted 0.5 mL Subcutaneous/     23 0159 Tdap Incomplete 0.5 mL Intramuscular/     23 0302 MMR Given 0.5 mL Subcutaneous/Left arm Flores Espinal RN            Delivery:    Episiotomy:     Lacerations:     Repair suture:     Repair # of packets:     Blood loss (ml):       Birth information:  YOB: 2023   Time of birth: 12:26 AM   Sex: female   Delivery type: , Low Transverse   Gestational Age: 40w0d     Measurements    Weight: 3210 g  Weight (lbs): 7 lb 1.2 oz  Length: 50.8 cm  Length (in): 20"  Head circumference: 33.7 cm         Delivery Clinician: Delivery Providers    Delivering clinician: Tin Parsons   Provider Role    Alley Simmons RN              Additional  information:  Forceps:    Vacuum:    Breech:    Observed anomalies      Living?:     Apgars    Living status: Living  Apgar Component Scores:  1 " min.:  5 min.:  10 min.:  15 min.:  20 min.:    Skin color:  0  1  1      Heart rate:  0  2  2      Reflex irritability:  0  2  2      Muscle tone:  0  2  2      Respiratory effort:  0  2  2      Total:  0  9  9      Apgars assigned by: TYRESE MERCHANT RN         Placenta: Delivered:       appearance  Pending Diagnostic Studies:       Procedure Component Value Units Date/Time    CBC auto differential [[id number]]     Order Status: Sent Lab Status: No result     Specimen: Blood     Narrative:      The following orders were created for panel order CBC auto differential.  Procedure                               Abnormality         Status                     ---------                               -----------         ------                     CBC with Differential[[id number]]                                                        Please view results for these tests on the individual orders.    CBC with Differential [[id number]]     Order Status: Sent Lab Status: No result     Specimen: Blood             Discharged Condition: Patient without complaints,tolerating a regular diet, ambulating without assistance, VSSAF incision clean and dry, patient ready to go home    Disposition: Home or Self Care    Follow Up:   Follow-up Information       Tin Kirkpatrick MD. Schedule an appointment as soon as possible for a visit in 2 week(s).    Specialty: Obstetrics and Gynecology  Contact information:  [address] [address] [phone]                           Patient Instructions:   No discharge procedures on file.  Medications:  There are no discharge medications for this patient.      Tin Kirkpatrick MD  Obstetrics  [facility] - 2nd Floor Mother/Baby Unit

## 2023-12-27 VITALS
WEIGHT: 183 LBS | HEART RATE: 80 BPM | HEIGHT: 62 IN | SYSTOLIC BLOOD PRESSURE: 122 MMHG | RESPIRATION RATE: 18 BRPM | DIASTOLIC BLOOD PRESSURE: 85 MMHG | OXYGEN SATURATION: 95 % | BODY MASS INDEX: 33.68 KG/M2 | TEMPERATURE: 98 F